# Patient Record
Sex: FEMALE | Race: ASIAN | NOT HISPANIC OR LATINO | Employment: FULL TIME | URBAN - METROPOLITAN AREA
[De-identification: names, ages, dates, MRNs, and addresses within clinical notes are randomized per-mention and may not be internally consistent; named-entity substitution may affect disease eponyms.]

---

## 2017-12-16 ENCOUNTER — APPOINTMENT (OUTPATIENT)
Dept: OTHER | Facility: IMAGING CENTER | Age: 32
End: 2017-12-16

## 2018-01-10 ENCOUNTER — HOSPITAL ENCOUNTER (INPATIENT)
Facility: MEDICAL CENTER | Age: 33
LOS: 2 days | End: 2018-01-12
Attending: OBSTETRICS & GYNECOLOGY | Admitting: OBSTETRICS & GYNECOLOGY
Payer: COMMERCIAL

## 2018-01-10 ENCOUNTER — APPOINTMENT (OUTPATIENT)
Dept: RADIOLOGY | Facility: MEDICAL CENTER | Age: 33
End: 2018-01-10
Attending: OBSTETRICS & GYNECOLOGY
Payer: COMMERCIAL

## 2018-01-10 PROBLEM — O41.03X1 OLIGOHYDRAMNIOS ANTEPARTUM, THIRD TRIMESTER, FETUS 1: Status: ACTIVE | Noted: 2018-01-10

## 2018-01-10 LAB
BASOPHILS # BLD AUTO: 0.3 % (ref 0–1.8)
BASOPHILS # BLD: 0.03 K/UL (ref 0–0.12)
EOSINOPHIL # BLD AUTO: 0.11 K/UL (ref 0–0.51)
EOSINOPHIL NFR BLD: 1.1 % (ref 0–6.9)
ERYTHROCYTE [DISTWIDTH] IN BLOOD BY AUTOMATED COUNT: 41.3 FL (ref 35.9–50)
HCT VFR BLD AUTO: 40.4 % (ref 37–47)
HGB BLD-MCNC: 13.7 G/DL (ref 12–16)
HOLDING TUBE BB 8507: NORMAL
IMM GRANULOCYTES # BLD AUTO: 0.05 K/UL (ref 0–0.11)
IMM GRANULOCYTES NFR BLD AUTO: 0.5 % (ref 0–0.9)
LYMPHOCYTES # BLD AUTO: 1.6 K/UL (ref 1–4.8)
LYMPHOCYTES NFR BLD: 15.5 % (ref 22–41)
MCH RBC QN AUTO: 29.9 PG (ref 27–33)
MCHC RBC AUTO-ENTMCNC: 33.9 G/DL (ref 33.6–35)
MCV RBC AUTO: 88.2 FL (ref 81.4–97.8)
MONOCYTES # BLD AUTO: 0.64 K/UL (ref 0–0.85)
MONOCYTES NFR BLD AUTO: 6.2 % (ref 0–13.4)
NEUTROPHILS # BLD AUTO: 7.89 K/UL (ref 2–7.15)
NEUTROPHILS NFR BLD: 76.4 % (ref 44–72)
NRBC # BLD AUTO: 0 K/UL
NRBC BLD-RTO: 0 /100 WBC
PLATELET # BLD AUTO: 256 K/UL (ref 164–446)
PMV BLD AUTO: 10.1 FL (ref 9–12.9)
RBC # BLD AUTO: 4.58 M/UL (ref 4.2–5.4)
WBC # BLD AUTO: 10.3 K/UL (ref 4.8–10.8)

## 2018-01-10 PROCEDURE — 700101 HCHG RX REV CODE 250

## 2018-01-10 PROCEDURE — 700105 HCHG RX REV CODE 258: Performed by: OBSTETRICS & GYNECOLOGY

## 2018-01-10 PROCEDURE — 76819 FETAL BIOPHYS PROFIL W/O NST: CPT

## 2018-01-10 PROCEDURE — 10H07YZ INSERTION OF OTHER DEVICE INTO PRODUCTS OF CONCEPTION, VIA NATURAL OR ARTIFICIAL OPENING: ICD-10-PCS | Performed by: OBSTETRICS & GYNECOLOGY

## 2018-01-10 PROCEDURE — 700111 HCHG RX REV CODE 636 W/ 250 OVERRIDE (IP)

## 2018-01-10 PROCEDURE — 700105 HCHG RX REV CODE 258

## 2018-01-10 PROCEDURE — 700111 HCHG RX REV CODE 636 W/ 250 OVERRIDE (IP): Performed by: OBSTETRICS & GYNECOLOGY

## 2018-01-10 PROCEDURE — 10907ZC DRAINAGE OF AMNIOTIC FLUID, THERAPEUTIC FROM PRODUCTS OF CONCEPTION, VIA NATURAL OR ARTIFICIAL OPENING: ICD-10-PCS | Performed by: OBSTETRICS & GYNECOLOGY

## 2018-01-10 PROCEDURE — 85025 COMPLETE CBC W/AUTO DIFF WBC: CPT

## 2018-01-10 PROCEDURE — 4A1HXCZ MONITORING OF PRODUCTS OF CONCEPTION, CARDIAC RATE, EXTERNAL APPROACH: ICD-10-PCS | Performed by: OBSTETRICS & GYNECOLOGY

## 2018-01-10 PROCEDURE — 770002 HCHG ROOM/CARE - OB PRIVATE (112)

## 2018-01-10 RX ORDER — BUPIVACAINE HYDROCHLORIDE 2.5 MG/ML
INJECTION, SOLUTION EPIDURAL; INFILTRATION; INTRACAUDAL
Status: DISCONTINUED
Start: 2018-01-10 | End: 2018-01-11

## 2018-01-10 RX ORDER — CARBOPROST TROMETHAMINE 250 UG/ML
250 INJECTION, SOLUTION INTRAMUSCULAR
Status: DISCONTINUED | OUTPATIENT
Start: 2018-01-10 | End: 2018-01-11

## 2018-01-10 RX ORDER — SODIUM CHLORIDE, SODIUM LACTATE, POTASSIUM CHLORIDE, CALCIUM CHLORIDE 600; 310; 30; 20 MG/100ML; MG/100ML; MG/100ML; MG/100ML
INJECTION, SOLUTION INTRAVENOUS
Status: COMPLETED
Start: 2018-01-10 | End: 2018-01-10

## 2018-01-10 RX ORDER — ONDANSETRON 2 MG/ML
4 INJECTION INTRAMUSCULAR; INTRAVENOUS EVERY 6 HOURS PRN
Status: DISCONTINUED | OUTPATIENT
Start: 2018-01-10 | End: 2018-01-11

## 2018-01-10 RX ORDER — ONDANSETRON 4 MG/1
4 TABLET, ORALLY DISINTEGRATING ORAL EVERY 6 HOURS PRN
Status: DISCONTINUED | OUTPATIENT
Start: 2018-01-10 | End: 2018-01-11

## 2018-01-10 RX ORDER — METHYLERGONOVINE MALEATE 0.2 MG/ML
0.2 INJECTION INTRAVENOUS
Status: DISCONTINUED | OUTPATIENT
Start: 2018-01-10 | End: 2018-01-11

## 2018-01-10 RX ORDER — HYDROXYZINE 50 MG/1
50 TABLET, FILM COATED ORAL EVERY 6 HOURS PRN
Status: DISCONTINUED | OUTPATIENT
Start: 2018-01-10 | End: 2018-01-11

## 2018-01-10 RX ORDER — ROPIVACAINE HYDROCHLORIDE 2 MG/ML
INJECTION, SOLUTION EPIDURAL; INFILTRATION; PERINEURAL
Status: COMPLETED
Start: 2018-01-10 | End: 2018-01-10

## 2018-01-10 RX ORDER — ALUMINA, MAGNESIA, AND SIMETHICONE 2400; 2400; 240 MG/30ML; MG/30ML; MG/30ML
30 SUSPENSION ORAL EVERY 6 HOURS PRN
Status: DISCONTINUED | OUTPATIENT
Start: 2018-01-10 | End: 2018-01-11

## 2018-01-10 RX ORDER — SODIUM CHLORIDE, SODIUM LACTATE, POTASSIUM CHLORIDE, CALCIUM CHLORIDE 600; 310; 30; 20 MG/100ML; MG/100ML; MG/100ML; MG/100ML
INJECTION, SOLUTION INTRAVENOUS CONTINUOUS
Status: DISCONTINUED | OUTPATIENT
Start: 2018-01-10 | End: 2018-01-11

## 2018-01-10 RX ORDER — MISOPROSTOL 200 UG/1
800 TABLET ORAL
Status: DISCONTINUED | OUTPATIENT
Start: 2018-01-10 | End: 2018-01-11

## 2018-01-10 RX ORDER — OXYTOCIN 10 [USP'U]/ML
10 INJECTION, SOLUTION INTRAMUSCULAR; INTRAVENOUS
Status: DISCONTINUED | OUTPATIENT
Start: 2018-01-10 | End: 2018-01-11

## 2018-01-10 RX ADMIN — SODIUM CHLORIDE, POTASSIUM CHLORIDE, SODIUM LACTATE AND CALCIUM CHLORIDE: 600; 310; 30; 20 INJECTION, SOLUTION INTRAVENOUS at 18:59

## 2018-01-10 RX ADMIN — SODIUM CHLORIDE, POTASSIUM CHLORIDE, SODIUM LACTATE AND CALCIUM CHLORIDE: 600; 310; 30; 20 INJECTION, SOLUTION INTRAVENOUS at 10:30

## 2018-01-10 RX ADMIN — SODIUM CHLORIDE, POTASSIUM CHLORIDE, SODIUM LACTATE AND CALCIUM CHLORIDE 1000 ML: 600; 310; 30; 20 INJECTION, SOLUTION INTRAVENOUS at 17:30

## 2018-01-10 RX ADMIN — SODIUM CHLORIDE, POTASSIUM CHLORIDE, SODIUM LACTATE AND CALCIUM CHLORIDE 1000 ML: 600; 310; 30; 20 INJECTION, SOLUTION INTRAVENOUS at 23:00

## 2018-01-10 RX ADMIN — SODIUM CHLORIDE, POTASSIUM CHLORIDE, SODIUM LACTATE AND CALCIUM CHLORIDE 1000 ML: 600; 310; 30; 20 INJECTION, SOLUTION INTRAVENOUS at 17:00

## 2018-01-10 RX ADMIN — Medication 1 MILLI-UNITS/MIN: at 12:40

## 2018-01-10 RX ADMIN — ROPIVACAINE HYDROCHLORIDE 100 ML: 2 INJECTION, SOLUTION EPIDURAL; INFILTRATION; PERINEURAL at 17:49

## 2018-01-10 ASSESSMENT — LIFESTYLE VARIABLES
EVER HAD A DRINK FIRST THING IN THE MORNING TO STEADY YOUR NERVES TO GET RID OF A HANGOVER: NO
EVER_SMOKED: NEVER
HAVE YOU EVER FELT YOU SHOULD CUT DOWN ON YOUR DRINKING: NO
TOTAL SCORE: 0
ON A TYPICAL DAY WHEN YOU DRINK ALCOHOL HOW MANY DRINKS DO YOU HAVE: 2
HOW MANY TIMES IN THE PAST YEAR HAVE YOU HAD 5 OR MORE DRINKS IN A DAY: 0
DO YOU DRINK ALCOHOL: YES
EVER FELT BAD OR GUILTY ABOUT YOUR DRINKING: NO
TOTAL SCORE: 0
HAVE PEOPLE ANNOYED YOU BY CRITICIZING YOUR DRINKING: NO
EVER_SMOKED: NEVER
AVERAGE NUMBER OF DAYS PER WEEK YOU HAVE A DRINK CONTAINING ALCOHOL: 0
ALCOHOL_USE: YES
TOTAL SCORE: 0
CONSUMPTION TOTAL: NEGATIVE

## 2018-01-10 ASSESSMENT — PATIENT HEALTH QUESTIONNAIRE - PHQ9
1. LITTLE INTEREST OR PLEASURE IN DOING THINGS: NOT AT ALL
SUM OF ALL RESPONSES TO PHQ9 QUESTIONS 1 AND 2: 0
SUM OF ALL RESPONSES TO PHQ QUESTIONS 1-9: 0
2. FEELING DOWN, DEPRESSED, IRRITABLE, OR HOPELESS: NOT AT ALL

## 2018-01-10 NOTE — PROGRESS NOTES
0900- Report received. Care assumed. POC discussed.  1100- Pitocin order clarified with Dr. Roblero. Will start Pitocin if no cervical change on next exam.   1115- SVE reveals some cervical change. Ctx stronger per pt. Will wait one hour and start Pitocin if no further change. Pt agrees. Pt instructed in position change options and pain management options.   1235- SVE no change.   1240- Pitocin started. Dr. Roblero at bedside. Update provided.   1620- Minimal cervical change. Pt very uncomfortable. Declines intervention at present.  1715- Dr. Roblero at bedside. Pt desires epidural.  1725- Sitting up for epidural.   1751- Epidural placed without difficulty. Pt comfortable.  1830- AROM/IUPC by Dr. Dean.  1900- Report to JAYCOB Gastelum RN.

## 2018-01-10 NOTE — H&P
DATE OF ADMISSION:  01/10/2018    ADMITTING DIAGNOSES:  1.  Intrauterine pregnancy at 38 and 2/7th weeks.  2.  Labor.  3.  Oligohydramnios.  4.  High-grade squamous intraepithelial lesion on Pap test and colposcopy.  5.  Asthma.    HISTORY:  This is a 32-year-old  1, para 0 with an estimated date of   confinement of 2018 established by last menstrual period consistent with   an early ultrasound making her 38 and 2/7th weeks who presents to labor and   delivery with complaints of contractions and bloody show this morning.  She   was examined on labor and delivery and was found to be 2 cm dilated, 80%   effaced, -1 station, vertex presentation.  She was monitored and changed her   cervix to 3-4 cm.  During the evaluation, the fetal heart tones were in the   130s, and a category 2 tracing.  A BPP was performed secondary to this and her   BRYANT was found to be 5.  The BPP subsequently returned to 8/8 and the fetal   heart rate tracing is now category 1.  However, because of early labor and   oligohydramnios, the recommendation is made to admit the patient to labor and   delivery.  She agrees and is unsure of pain management at this time.    Prenatal care has been with Dr. Roblero.  Her estimated date of confinement of   2018 was established by last menstrual period consistent with a first   trimester ultrasound.    PRENATAL LABS:  Her blood type is AB positive, antibody screen negative, RPR   nonreactive, rubella immune, hepatitis B surface antigen negative, hepatitis C   negative, and HIV negative.  Her 1-hour Glucola was normal.  Her group B   strep status is negative.  Noninvasive prenatal screening was at low risk.    AFP only was at low risk.  Complications with the pregnancy include ASCUS,   cannot rule out high-grade dysplasia, HPV positive, but 16 and 18 negative,   colposcopy impression were HGSIL at 16 weeks and at 28 weeks.    Total weight gain for the pregnancy has been approximately 22  "pounds.  She has   an anterior placenta with no previa.    ALLERGIES:  She has no known drug allergies.    MEDICATIONS:  Include prenatal vitamins.    PAST MEDICAL HISTORY:  Significant for asthma.    PAST SURGICAL HISTORY:  Negative.    SOCIAL HISTORY:  She was \"vaping\"  at the beginning of the pregnancy, but has   since stopped allegedly, she has otherwise no narcotic drug or alcohol use.    GYNECOLOGIC HISTORY:  She has no history of any HSV.  Her Pap test at the   beginning of her pregnancy showed ASCUS, cannot rule out high grade, HPV   positive, but 16 and 18 are negative; serial colposcopies throughout her   pregnancy have shown a colposcopic impression consistent with high-grade   dysplasia.    OBSTETRICAL HISTORY:   1 is her current pregnancy.    PHYSICAL EXAMINATION:  VITAL SIGNS:  Temperature is 98.8, pulse is 88, and blood pressure is 126/81.  GENERAL:  She is pleasant, in no apparent distress.  ABDOMEN:  Gravid and nontender.  EXTREMITIES:  Show +1 pedal edema.  She has no cords or Homans sign.  Fetal   heart tones are in the 140s and a category 1 tracing.  Tocometry shows   contractions every 2 minutes.  Cervical exam per the nurse, she is 3-4 cm   dilated, 80% effaced, -1 station, vertex presentation.  She does have a narrow   arch.  Estimated fetal weight is 3200 g.    IMPRESSION:  This is a 32-year-old  1, para 0 at 38 and 2/7th weeks in   early labor with oligohydramnios.    PLAN:  1.  Patient will be admitted to labor and delivery.  2.  IV fluids will be started.  Her labor will be monitored and Pitocin will   be initiated if necessary.  3.  There is currently reassuring fetal surveillance.  4.  The patient is unsure about what she wants to take for pain management.       ____________________________________     MD TOMAS JENNINGS / JAVIER    DD:  01/10/2018 08:57:25  DT:  01/10/2018 09:23:09    D#:  [id number]  Job#:  [id number]    "

## 2018-01-10 NOTE — CARE PLAN
Problem: Knowledge Deficit  Goal: Patient/Support person demonstrates understanding regarding the progression of labor, available options and participates in decision-making process  Outcome: PROGRESSING AS EXPECTED  G1 at 38-3 in early labor with oligohydramnios  POC discussed.  Questions encouraged/answered.  Basic FM/ctx interpretation provided.  Pain management options discussed.  Pt and FOB verbalize understanding.      Problem: Risk for injury  Goal: Patient and fetus will be free of preventable injury/complications  Outcome: PROGRESSING AS EXPECTED  G1 at 38-3- GBS neg- oligohydramnios  Pitocin augmentation and continuous EFM in use.  Pitocin increased per protocol.  FHT reassuring.  Maternal VSS.  Afebrile.

## 2018-01-10 NOTE — PROGRESS NOTES
"   EDC - 38 3/7    Pt presents to L&D with c/o \"I think I'm george and bleeding.\" Pt states that she noticed some spotting today at 0330 which she describes as \"light pink.\" Pt also states she has been having some intermittent back pain as well. Pt reports good fetal movement, denies leaking of fluid. EFM/TOCO applied. SVE 2-3/70/-2.    0635- Dr Baird called- report given. Tracing reviewed by MD. Order received for BPP.    0800- BPP , BRYANT= 5.5, SVE repeated 3-/-2. Dr Baird updated. Dr Baird to call Dr Roblero.     0805- Dr Baird on phone, order received to admit pt to L&D per Dr Roblero.   "

## 2018-01-11 LAB
ERYTHROCYTE [DISTWIDTH] IN BLOOD BY AUTOMATED COUNT: 42.1 FL (ref 35.9–50)
HCT VFR BLD AUTO: 34.7 % (ref 37–47)
HGB BLD-MCNC: 11.8 G/DL (ref 12–16)
MCH RBC QN AUTO: 30 PG (ref 27–33)
MCHC RBC AUTO-ENTMCNC: 34 G/DL (ref 33.6–35)
MCV RBC AUTO: 88.3 FL (ref 81.4–97.8)
PLATELET # BLD AUTO: 194 K/UL (ref 164–446)
PMV BLD AUTO: 9.4 FL (ref 9–12.9)
RBC # BLD AUTO: 3.93 M/UL (ref 4.2–5.4)
WBC # BLD AUTO: 22.3 K/UL (ref 4.8–10.8)

## 2018-01-11 PROCEDURE — A9270 NON-COVERED ITEM OR SERVICE: HCPCS | Performed by: OBSTETRICS & GYNECOLOGY

## 2018-01-11 PROCEDURE — 59409 OBSTETRICAL CARE: CPT

## 2018-01-11 PROCEDURE — 700111 HCHG RX REV CODE 636 W/ 250 OVERRIDE (IP)

## 2018-01-11 PROCEDURE — 700102 HCHG RX REV CODE 250 W/ 637 OVERRIDE(OP): Performed by: OBSTETRICS & GYNECOLOGY

## 2018-01-11 PROCEDURE — 303615 HCHG EPIDURAL/SPINAL ANESTHESIA FOR LABOR

## 2018-01-11 PROCEDURE — 700112 HCHG RX REV CODE 229: Performed by: OBSTETRICS & GYNECOLOGY

## 2018-01-11 PROCEDURE — 0KQM0ZZ REPAIR PERINEUM MUSCLE, OPEN APPROACH: ICD-10-PCS | Performed by: OBSTETRICS & GYNECOLOGY

## 2018-01-11 PROCEDURE — 36415 COLL VENOUS BLD VENIPUNCTURE: CPT

## 2018-01-11 PROCEDURE — 85027 COMPLETE CBC AUTOMATED: CPT

## 2018-01-11 PROCEDURE — 304965 HCHG RECOVERY SERVICES

## 2018-01-11 PROCEDURE — 700111 HCHG RX REV CODE 636 W/ 250 OVERRIDE (IP): Performed by: OBSTETRICS & GYNECOLOGY

## 2018-01-11 PROCEDURE — 770002 HCHG ROOM/CARE - OB PRIVATE (112)

## 2018-01-11 RX ORDER — DOCUSATE SODIUM 100 MG/1
100 CAPSULE, LIQUID FILLED ORAL 2 TIMES DAILY PRN
Status: DISCONTINUED | OUTPATIENT
Start: 2018-01-11 | End: 2018-01-12 | Stop reason: HOSPADM

## 2018-01-11 RX ORDER — IBUPROFEN 600 MG/1
600 TABLET ORAL EVERY 6 HOURS PRN
Status: DISCONTINUED | OUTPATIENT
Start: 2018-01-11 | End: 2018-01-12 | Stop reason: HOSPADM

## 2018-01-11 RX ORDER — MISOPROSTOL 200 UG/1
800 TABLET ORAL
Status: DISCONTINUED | OUTPATIENT
Start: 2018-01-11 | End: 2018-01-12 | Stop reason: HOSPADM

## 2018-01-11 RX ORDER — SODIUM CHLORIDE, SODIUM LACTATE, POTASSIUM CHLORIDE, CALCIUM CHLORIDE 600; 310; 30; 20 MG/100ML; MG/100ML; MG/100ML; MG/100ML
INJECTION, SOLUTION INTRAVENOUS PRN
Status: DISCONTINUED | OUTPATIENT
Start: 2018-01-11 | End: 2018-01-12 | Stop reason: HOSPADM

## 2018-01-11 RX ORDER — ACETAMINOPHEN 325 MG/1
325 TABLET ORAL EVERY 4 HOURS PRN
Status: DISCONTINUED | OUTPATIENT
Start: 2018-01-11 | End: 2018-01-12 | Stop reason: HOSPADM

## 2018-01-11 RX ORDER — HYDROCODONE BITARTRATE AND ACETAMINOPHEN 10; 325 MG/1; MG/1
1 TABLET ORAL EVERY 4 HOURS PRN
Status: DISCONTINUED | OUTPATIENT
Start: 2018-01-11 | End: 2018-01-12 | Stop reason: HOSPADM

## 2018-01-11 RX ORDER — VITAMIN A ACETATE, BETA CAROTENE, ASCORBIC ACID, CHOLECALCIFEROL, .ALPHA.-TOCOPHEROL ACETATE, DL-, THIAMINE MONONITRATE, RIBOFLAVIN, NIACINAMIDE, PYRIDOXINE HYDROCHLORIDE, FOLIC ACID, CYANOCOBALAMIN, CALCIUM CARBONATE, FERROUS FUMARATE, ZINC OXIDE, CUPRIC OXIDE 3080; 12; 120; 400; 1; 1.84; 3; 20; 22; 920; 25; 200; 27; 10; 2 [IU]/1; UG/1; MG/1; [IU]/1; MG/1; MG/1; MG/1; MG/1; MG/1; [IU]/1; MG/1; MG/1; MG/1; MG/1; MG/1
1 TABLET, FILM COATED ORAL EVERY MORNING
Status: DISCONTINUED | OUTPATIENT
Start: 2018-01-11 | End: 2018-01-12 | Stop reason: HOSPADM

## 2018-01-11 RX ORDER — METHYLERGONOVINE MALEATE 0.2 MG/ML
0.2 INJECTION INTRAVENOUS
Status: DISCONTINUED | OUTPATIENT
Start: 2018-01-11 | End: 2018-01-12 | Stop reason: HOSPADM

## 2018-01-11 RX ORDER — HYDROCODONE BITARTRATE AND ACETAMINOPHEN 5; 325 MG/1; MG/1
1 TABLET ORAL EVERY 4 HOURS PRN
Status: DISCONTINUED | OUTPATIENT
Start: 2018-01-11 | End: 2018-01-12 | Stop reason: HOSPADM

## 2018-01-11 RX ADMIN — IBUPROFEN 600 MG: 600 TABLET, FILM COATED ORAL at 10:01

## 2018-01-11 RX ADMIN — DOCUSATE SODIUM 100 MG: 100 CAPSULE ORAL at 20:34

## 2018-01-11 RX ADMIN — HYDROCODONE BITARTRATE AND ACETAMINOPHEN 1 TABLET: 5; 325 TABLET ORAL at 20:33

## 2018-01-11 RX ADMIN — HYDROCODONE BITARTRATE AND ACETAMINOPHEN 1 TABLET: 5; 325 TABLET ORAL at 08:08

## 2018-01-11 RX ADMIN — DOCUSATE SODIUM 100 MG: 100 CAPSULE ORAL at 08:08

## 2018-01-11 RX ADMIN — Medication 1 TABLET: at 08:06

## 2018-01-11 RX ADMIN — IBUPROFEN 600 MG: 600 TABLET, FILM COATED ORAL at 17:01

## 2018-01-11 RX ADMIN — Medication 1000 ML/HR: at 02:15

## 2018-01-11 RX ADMIN — IBUPROFEN 600 MG: 600 TABLET, FILM COATED ORAL at 02:52

## 2018-01-11 RX ADMIN — Medication 20 UNITS: at 03:24

## 2018-01-11 ASSESSMENT — PAIN SCALES - GENERAL
PAINLEVEL_OUTOF10: 0
PAINLEVEL_OUTOF10: 3
PAINLEVEL_OUTOF10: 3
PAINLEVEL_OUTOF10: 4
PAINLEVEL_OUTOF10: 2
PAINLEVEL_OUTOF10: 3
PAINLEVEL_OUTOF10: 3

## 2018-01-11 NOTE — PROGRESS NOTES
Mom and baby bonding well, pain controlled and no distress noted.  Updated with plan of care, call light in reach and encourage to call for assistance.

## 2018-01-11 NOTE — PROGRESS NOTES
Baby:  Female, APGARs 8-9, not weighed yet  Tight nuchal cord  plac spont,  cc  No epis  Very small 2nd-degree lac, 3-0V

## 2018-01-11 NOTE — DELIVERY NOTE
DATE OF SERVICE:  2018    PROCEDURES:  1.  Epidural anesthesia.  2.  Augmentation of labor with oxytocin.  3.  Spontaneous vaginal delivery.  4.  Repair of second-degree perineal laceration.    OBSTETRICIAN:  Lucas Dean MD    DIAGNOSES:  1.  38-3/7 week gestation, delivered.  2.  Labor.  3.  Oligohydramnios.  4.  Tight nuchal cord.    COMPLICATIONS:  None.    ESTIMATED BLOOD LOSS:  100 mL.    BABY:  Female, 1-minute Apgar 8 and 5-minute Apgar 9.  The baby has not been   weighed yet.    DESCRIPTION OF PROCEDURE:  This 32-year-old lady is now G1, P1.  She was   admitted yesterday morning with spontaneous labor.  Ultrasound revealed   oligohydramnios.  She required oxytocin augmentation.  She received epidural   anesthesia.  Amniotomy at 5 cm dilatation produced clear fluid.  Delivery   occurred approximately 7.5 hours post-amniotomy.  She pushed effectively.  She   pushed the baby out occiput anterior in a controlled fashion with no   episiotomy.  There was a tight nuchal cord, which I was unable to reduce, so I   needed to doubly clamp and cut it.  After that, the shoulders and body   delivered easily, I placed the baby on her chest.  The placenta and all   attached membranes delivered spontaneously in a timely fashion.  The patient   sustained a very small second-degree perineal laceration, slightly to the left   of midline, repaired with layered running 3-0 Vicryl suture.  Sponge and   needle counts were correct.       ____________________________________     MD CALLI MARCELINO / NTS    DD:  2018 02:36:01  DT:  2018 02:57:38    D#:  [id number]  Job#:  [id number]

## 2018-01-11 NOTE — CARE PLAN
Problem: Alteration in comfort related to episiotomy, vaginal repair and/or after birth pains  Goal: Patient is able to ambulate, care for self and infant  Outcome: PROGRESSING AS EXPECTED  Pain controlled with motrin and norco, up ambulating with steady gait    Problem: Potential knowledge deficit related to lack of understanding of self and  care  Goal: Patient will verbalize understanding of self and infant care  Outcome: PROGRESSING AS EXPECTED  Updated with plan of care, breastfeeding and pain control

## 2018-01-11 NOTE — PROGRESS NOTES
S:  Pt very uncomfortable with contractions.  Cervical exam essentially unchanged from 1200.  Discussed options and pt agrees to epidural and then AROM/IUPC placement    O:  VSS -afebrile  FHTs 140s Cat 1  Lesage: Q 2  Cx: 4-5/80/-2 per RN  Pit 5    A/P  IUP at 38 2/7 weeks, labor, oligo - fair  1.  Labor:  Minimal change. Pt to receive epidural. AROM and IUPC placement after that  2.  FWB: reassuring  3.  Dr. Dean to cover

## 2018-01-11 NOTE — CARE PLAN
Problem: Safety  Goal: Will remain free from injury  Outcome: PROGRESSING AS EXPECTED  Pt remains free from injury. Pt educated about not getting out of bed with epidural in place.     Problem: Infection  Goal: Will remain free from infection  Outcome: PROGRESSING AS EXPECTED  Pt remains free from signs/symptoms of infection. Pt is afebrile.

## 2018-01-11 NOTE — CARE PLAN
Problem: Altered physiologic condition related to immediate post-delivery state and potential for bleeding/hemorrhage  Goal: Patient physiologically stable as evidenced by normal lochia, palpable uterine involution and vital signs within normal limits  Outcome: PROGRESSING AS EXPECTED  Fundus firm @ U, lochia rubra minimal. V/S within defined limits.     Problem: Alteration in comfort related to episiotomy, vaginal repair and/or after birth pains  Goal: Patient verbalizes acceptable pain level  Outcome: PROGRESSING AS EXPECTED  Patient verbalized acceptable pain level @ this time. Will be mediated as needed.

## 2018-01-11 NOTE — PROGRESS NOTES
Admitted from labor and delivery, post vag delivery in satisfactory condition. Came via wheelchair and placed to bed comfortably. IVF of LR with 20 Units of Pitocin infusing well into right hand. Fundus firm @ U, lochia rubra minimal. Oriented to room/liat light, call light place within reach. Will continue to monitor.

## 2018-01-11 NOTE — PROGRESS NOTES
1900: Report received from CARMEN Moore RN. POC discussed with patient, all questions and concerns addressed.   2115: SVE 7-8/90/-1  2248: SVE Lip/100/0   0038: SVE Complete, station +1  0045: Dr. Dean updated with patient status.   0200: Dr. Dean at bedside for delivery.   0213: Infant delivered by Dr. Dean, 8/9 Apgars weighting 2765g. 2 degree laceration with repair,  mL. Tight nuchal cord x1, clamped.   0410: Pt ambulated to the bathroom with a steady gait and the stand-by assistance of this RN. Pt declines feeling dizzy.   0415: Roxy care done, pad and underwear in place. New gown provided to patient.   0438: Pt transported from S221 to S338 via wheelchair with infant in arms, report given to KAPIL Cho.

## 2018-01-12 VITALS
SYSTOLIC BLOOD PRESSURE: 127 MMHG | HEART RATE: 75 BPM | HEIGHT: 60 IN | WEIGHT: 166 LBS | TEMPERATURE: 97.9 F | BODY MASS INDEX: 32.59 KG/M2 | RESPIRATION RATE: 18 BRPM | DIASTOLIC BLOOD PRESSURE: 81 MMHG | OXYGEN SATURATION: 97 %

## 2018-01-12 PROCEDURE — 700102 HCHG RX REV CODE 250 W/ 637 OVERRIDE(OP): Performed by: OBSTETRICS & GYNECOLOGY

## 2018-01-12 PROCEDURE — A9270 NON-COVERED ITEM OR SERVICE: HCPCS | Performed by: OBSTETRICS & GYNECOLOGY

## 2018-01-12 RX ADMIN — Medication 1 TABLET: at 10:08

## 2018-01-12 RX ADMIN — IBUPROFEN 600 MG: 600 TABLET, FILM COATED ORAL at 06:31

## 2018-01-12 ASSESSMENT — PAIN SCALES - GENERAL
PAINLEVEL_OUTOF10: 4
PAINLEVEL_OUTOF10: 3
PAINLEVEL_OUTOF10: 0

## 2018-01-12 NOTE — PROGRESS NOTES
Assessment complete. POC discussed and patient verbalized understanding. Reveiwed feeding frequency and length, skin to skin, and bundle wrapping infant. Lanolin provided for nipple soreness.  All questions answered. Will continue to assess.

## 2018-01-12 NOTE — PROGRESS NOTES
PP day #1    S:  Pt doing well.  Minimal lochia.  Breast feeding. No problems voiding.  Ready to go home    O: T 97.9 P 77  /79  ABD:  Soft, NT, FF below umb  EXT: +2 pedal edema, no cords or Homans  H/H   AB+  GBS neg    A/P:  PP day #1 S/P  at term - doing well  1.  D/C home  2.  F/U Dr. Roblero 6 weeks for pp check and colpo  3.  OTC Motrin for pain

## 2018-01-12 NOTE — CONSULTS
Lactation note:     Initial visit.  Discussed normal  behaviors and normal course of breastfeeding at 12-24-48-72 hours, and what to expect. Discussed importance of offering breast every 2-3 hours, and even if infant shows no interest, can do hand expression into infant's lips. Encouraged to continue doing skin to skin. Discussed signs of a good latch, voiding and stooling patterns, feeding cues, stomach size, and importance of establishing milk supply with frequency of feedings.   Breastfeeding essentials pamphlet given, and reviewed.        Observed MOB latching infant to right breast, assisted with placement and achieving deeper latch in football hold. MOB denies pain with latch, swallows heard. Showed MOB how to do hand expression. Good colostrum noted with hand expression.     Discussed discharge feeding plan if able to be discharged tomorrow-      1- To breastfeed first, every 2-3 hours. Not to go past 4 hours for feedings.   2. If not latching well, or feedings are not longer than 5 minutes, may need to use breastpump to protect supply.  Encouraged parents to keep track of diapers and feedings.     Information given for lactation connection, and breastfeeding forums for outpatient lactation support.

## 2018-01-12 NOTE — PROGRESS NOTES
Discharged home and walked out with volunteer to car.  [facility]

## 2018-01-12 NOTE — DISCHARGE INSTRUCTIONS
POSTPARTUM DISCHARGE INSTRUCTIONS FOR MOM    YOB: 1985   Age: 32 y.o.               Admit Date: 1/10/2018     Discharge Date: 2018  Attending Doctor:  Justyna Roblero M.D.                  Allergies:  Cantaloupe; Honey dew; Kiwi extract; and Orange      Discharge Plan:   Diet Plan: Discussed  Activity Level: Discussed  Confirmed Follow up Appointment: Patient to Call and Schedule Appointment  Influenza Vaccine Indication: Patient Refuses    REASONS TO CALL YOUR OBSTETRICIAN:  1.   Persistent fever or shaking chills (Temperature higher than 100.4)  2.   Heavy bleeding (soaking more than 1 pad per hour); Passing clots  3.   Foul odor from vagina  4.   Mastitis (Breast infection; breast pain, chills, fever, redness)  5.   Urinary pain, burning or frequency  6.   Episiotomy infection  7.   Abdominal incision infection  8.   Severe depression longer than 24 hours    HAND WASHING  · Prior to handling the baby.  · Before breastfeeding or bottle feeding baby.  · After using the bathroom or changing the baby's diaper.    WOUND CARE  Ask your physician for additional care instructions.  In general:    ·  Incision:      · Keep clean and dry.    · Do NOT lift anything heavier than your baby for up to 6 weeks.    · There should not be any opening or pus.      VAGINAL CARE  · Nothing inside vagina for 6 weeks: no sexual intercourse, tampons or douching.  · Bleeding may continue for 2-4 weeks.  Amount may vary.    · Call your physician for heavy bleeding which means soaking more than 1 pad per hour    BIRTH CONTROL  · It is possible to become pregnant at any time after delivery and while breastfeeding.  · Plan to discuss a method of birth control with your physician at your follow up visit. visit.    DIET AND ELIMINATION  · Eating more fiber (bran cereal, fruits, and vegetables) and drinking plenty of fluids will help to avoid constipation.  · Urinary frequency after childbirth is normal.    POSTPARTUM  "BLUES  During the first few days after birth, you may experience a sense of the \"blues\" which may include impatience, irritability or even crying.  These feeling come and go quickly.  However, as many as 1 in 10 women experience emotional symptoms known as postpartum depression.    Postpartum depression:  May start as early as the second or third day after delivery or take several weeks or months to develop.  Symptoms of \"blues\" are present, but are more intense:  Crying spells; loss of appetite; feelings of hopelessness or loss of control; fear of touching the baby; over concern or no concern at all about the baby; little or no concern about your own appearance/caring for yourself; and/or inability to sleep or excessive sleeping.  Contact your physician if you are experiencing any of these symptoms.    Crisis Hotline:  · [place] Crisis Hotline:  0-974-KNUTPMS  Or 1-729.871.9494  · [place] Crisis Hotline:  1-299.553.3532  Or 722-539-4531    PREVENTING SHAKEN BABY:  If you are angry or stressed, PUT THE BABY IN THE CRIB, step away, take some deep breaths, and wait until you are calm to care for the baby.  DO NOT SHAKE THE BABY.  You are not alone, call a supporter for help.    · Crisis Call Center 24/7 crisis line 115-353-3053 or 1-977.795.7790  · You can also text them, text \"ANSWER\" to 926945    QUIT SMOKING/TOBACCO USE:  I understand the use of any tobacco products increases my chance of suffering from future heart disease and could cause other illnesses which may shorten my life. Quitting the use of tobacco products is the single most important thing I can do to improve my health. For further information on smoking / tobacco cessation call a Toll Free Quit Line at 1-920.985.9827 (*[employer] [place]) or 1-795.444.5793 (American Lung Association) or you can access the web based program at www.lungusa.org.    · [place] Tobacco Users Help Line:  (959) 677-3051       Toll Free: 1-563.181.9060  · Quit Tobacco " Program [facility] (376)528-9911    DEPRESSION / SUICIDE RISK:  As you are discharged from this [facility], it is important to learn how to keep safe from harming yourself.    Recognize the warning signs:  · Abrupt changes in personality, positive or negative- including increase in energy   · Giving away possessions  · Change in eating patterns- significant weight changes-  positive or negative  · Change in sleeping patterns- unable to sleep or sleeping all the time   · Unwillingness or inability to communicate  · Depression  · Unusual sadness, discouragement and loneliness  · Talk of wanting to die  · Neglect of personal appearance   · Rebelliousness- reckless behavior  · Withdrawal from people/activities they love  · Confusion- inability to concentrate     If you or a loved one observes any of these behaviors or has concerns about self-harm, here's what you can do:  · Talk about it- your feelings and reasons for harming yourself  · Remove any means that you might use to hurt yourself (examples: pills, rope, extension cords, firearm)  · Get professional help from the community (Mental Health, Substance Abuse, psychological counseling)  · Do not be alone:Call your Safe Contact- someone whom you trust who will be there for you.  · Call your local CRISIS HOTLINE 076-2753 or 186-678-8369  · Call your local Children's Mobile Crisis Response Team [place] (786) 302-5934 or www.[employer]  · Call the toll free National Suicide Prevention Hotlines   · National Suicide Prevention Lifeline 848-964-KPLQ (8321)  · National Hope Line Network 800-SUICIDE (158-4893)    DISCHARGE SURVEY:  Thank you for choosing [facility].  We hope we provided you with very good care.  You may be receiving a survey in the mail.  Please fill it out.  Your opinion is valuable to us.    ADDITIONAL EDUCATIONAL MATERIALS GIVEN TO PATIENT:        My signature on this form indicates that:  1.  I have  reviewed and understand the above information  2.  My questions regarding this information have been answered to my satisfaction.  3.  I have formulated a plan with my discharge nurse to obtain my prescribed medication for home.

## 2018-01-12 NOTE — CARE PLAN
Problem: Risk for Infection, Impaired Wound Healing  Goal: Remain free from signs and symptoms of infection  Outcome: PROGRESSING AS EXPECTED

## 2018-01-12 NOTE — CARE PLAN
Problem: Altered physiologic condition related to immediate post-delivery state and potential for bleeding/hemorrhage  Goal: Patient physiologically stable as evidenced by normal lochia, palpable uterine involution and vital signs within normal limits  Outcome: PROGRESSING AS EXPECTED  Fundus firm, lochia light. Vital signs stable.     Problem: Alteration in comfort related to episiotomy, vaginal repair and/or after birth pains  Goal: Patient verbalizes acceptable pain level  Outcome: PROGRESSING AS EXPECTED  Patient will call to request pain medications as needed.

## 2018-02-13 NOTE — DISCHARGE SUMMARY
DATE OF ADMISSION:  01/10/2018    DATE OF DISCHARGE:  01/12/2018    ADMITTING DIAGNOSES:  1.  Intrauterine pregnancy at 38-2/7 weeks.  2.  Labor.  3.  Oligohydramnios.    HISTORY AND PHYSICAL:  Please see previously dictated history and physical.    HOSPITAL COURSE:  The patient was admitted to labor and delivery and   subsequently underwent a normal spontaneous vaginal delivery of a viable   female infant, Apgars were 8 and 9, baby was not weighed at the time of   dictation, estimated blood loss of 100 mL.  She had a second-degree   laceration.  The patient's postpartum course was unremarkable and on   postpartum day #1, she was voiding well, had minimal lochia and was   breastfeeding and ready to be discharged home.    PHYSICAL EXAMINATION ON DISCHARGE:  VITAL SIGNS:  Temperature is 97.9, pulse 77, blood pressure 130/79.  ABDOMEN:  Soft and nontender.  Fundus is firm below the umbilicus.  EXTREMITIES:  Show +2 pedal edema.  She had no cords or Homans sign.    LABORATORY DATA:  Her postpartum H and H was 11 and 34.  Blood type is AB   positive.  Group B strep status is negative.    DISCHARGE DIAGNOSES:  1.  Intrauterine pregnancy at 38-2/7 weeks.  2.  Status post normal spontaneous vaginal delivery.    DISCHARGE INSTRUCTIONS:  1.  Patient will be discharged home with instructions to follow up with Dr. Roblero in 6 weeks for a postpartum check and a colposcopy.  2.  She was given recommendation for over-the-counter Motrin for pain.  3.  She is instructed on pelvic rest for 6 weeks.       ____________________________________     MD TOMAS JENNINGS / JAVIER    DD:  02/12/2018 17:15:54  DT:  02/13/2018 05:21:22    D#:  [id number]  Job#:  [id number]

## 2018-10-19 ENCOUNTER — HOSPITAL ENCOUNTER (OUTPATIENT)
Dept: LAB | Facility: MEDICAL CENTER | Age: 33
End: 2018-10-19
Attending: PHYSICIAN ASSISTANT
Payer: COMMERCIAL

## 2018-10-19 PROCEDURE — 88175 CYTOPATH C/V AUTO FLUID REDO: CPT

## 2018-10-20 LAB — CYTOLOGY REG CYTOL: NORMAL

## 2018-12-31 ENCOUNTER — HOSPITAL ENCOUNTER (OUTPATIENT)
Dept: LAB | Facility: MEDICAL CENTER | Age: 33
End: 2018-12-31
Attending: PHYSICIAN ASSISTANT
Payer: COMMERCIAL

## 2018-12-31 LAB — CYTOLOGY REG CYTOL: NORMAL

## 2018-12-31 PROCEDURE — 88175 CYTOPATH C/V AUTO FLUID REDO: CPT
